# Patient Record
Sex: FEMALE | URBAN - METROPOLITAN AREA
[De-identification: names, ages, dates, MRNs, and addresses within clinical notes are randomized per-mention and may not be internally consistent; named-entity substitution may affect disease eponyms.]

---

## 2024-04-19 ENCOUNTER — LAB (OUTPATIENT)
Dept: URBAN - METROPOLITAN AREA CLINIC 2 | Facility: CLINIC | Age: 39
End: 2024-04-19

## 2024-04-19 ENCOUNTER — OV NP (OUTPATIENT)
Dept: URBAN - METROPOLITAN AREA CLINIC 2 | Facility: CLINIC | Age: 39
End: 2024-04-19
Payer: COMMERCIAL

## 2024-04-19 VITALS
HEART RATE: 83 BPM | SYSTOLIC BLOOD PRESSURE: 128 MMHG | TEMPERATURE: 98.1 F | DIASTOLIC BLOOD PRESSURE: 91 MMHG | WEIGHT: 149.6 LBS | HEIGHT: 62 IN | BODY MASS INDEX: 27.53 KG/M2

## 2024-04-19 DIAGNOSIS — K62.5 RECTAL BLEEDING: ICD-10-CM

## 2024-04-19 DIAGNOSIS — R10.32 LLQ PAIN: ICD-10-CM

## 2024-04-19 DIAGNOSIS — K21.9 GASTROESOPHAGEAL REFLUX DISEASE, UNSPECIFIED WHETHER ESOPHAGITIS PRESENT: ICD-10-CM

## 2024-04-19 PROBLEM — 235595009: Status: ACTIVE | Noted: 2024-04-19

## 2024-04-19 PROCEDURE — 99244 OFF/OP CNSLTJ NEW/EST MOD 40: CPT | Performed by: NURSE PRACTITIONER

## 2024-04-19 PROCEDURE — 99204 OFFICE O/P NEW MOD 45 MIN: CPT | Performed by: NURSE PRACTITIONER

## 2024-04-19 RX ORDER — DICYCLOMINE HYDROCHLORIDE 20 MG/1
1 TABLET TABLET ORAL
Qty: 90 TABLET | Refills: 0 | OUTPATIENT
Start: 2024-04-19 | End: 2024-05-19

## 2024-04-19 RX ORDER — PANTOPRAZOLE SODIUM 40 MG/1
1 TABLET TABLET, DELAYED RELEASE ORAL ONCE A DAY
Qty: 90 TABLET | Refills: 1 | OUTPATIENT
Start: 2024-04-19

## 2024-04-19 NOTE — PHYSICAL EXAM GASTROINTESTINAL
Abdomen , soft, left sided tenderness to palpation, nondistended , no guarding or rigidity , no masses palpable , normal bowel sounds , Liver and Spleen , no hepatomegaly present , no hepatosplenomegaly , liver nontender , spleen not palpable

## 2024-04-19 NOTE — HPI-TODAY'S VISIT:
This patient was referred by Dr. Sanjuanita Wall for an evaluation of LLQ abdominal pain and rectal bleeding.  A copy of this will be sent to the referring provider. Seen by GYN-neg ema. sent to GI for further evaluation. Pain ongoing almost 1 yr. has constipation and diarrhea. pain is left side periumbilical and radiates up. cannot lie on left side. has some reflux at night. recetly diagnosed with type 1 diabetes but hypoglycemic.  had hematochezia x4 days last week. did not seem like her usual bleeding withhemorrhoids. labs in [facility] and reviewed/ no anemia, normal lft. strong fam hx of gi cancers grandmother pancreatic mother gallbladder

## 2024-05-10 ENCOUNTER — OFFICE VISIT (OUTPATIENT)
Dept: URBAN - METROPOLITAN AREA SURGERY CENTER 31 | Facility: SURGERY CENTER | Age: 39
End: 2024-05-10
Payer: COMMERCIAL

## 2024-05-10 DIAGNOSIS — R12 HEARTBURN: ICD-10-CM

## 2024-05-10 DIAGNOSIS — R10.13 EPIGASTRIC ABDOMINAL PAIN: ICD-10-CM

## 2024-05-10 DIAGNOSIS — R12 BURNING REFLUX: ICD-10-CM

## 2024-05-10 DIAGNOSIS — K29.60 ADENOPAPILLOMATOSIS GASTRICA: ICD-10-CM

## 2024-05-10 DIAGNOSIS — R10.84 ABDOMINAL CRAMPING, GENERALIZED: ICD-10-CM

## 2024-05-10 DIAGNOSIS — K44.9 HIATAL HERNIA: ICD-10-CM

## 2024-05-10 DIAGNOSIS — K92.1 RECTAL BLEEDING: ICD-10-CM

## 2024-05-10 PROCEDURE — 45380 COLONOSCOPY AND BIOPSY: CPT | Performed by: INTERNAL MEDICINE

## 2024-05-10 PROCEDURE — 43239 EGD BIOPSY SINGLE/MULTIPLE: CPT | Performed by: INTERNAL MEDICINE

## 2024-05-10 PROCEDURE — G8907 PT DOC NO EVENTS ON DISCHARG: HCPCS | Performed by: INTERNAL MEDICINE

## 2024-05-10 PROCEDURE — 00813 ANES UPR LWR GI NDSC PX: CPT | Performed by: NURSE ANESTHETIST, CERTIFIED REGISTERED

## 2024-05-31 ENCOUNTER — OFFICE VISIT (OUTPATIENT)
Dept: URBAN - METROPOLITAN AREA CLINIC 2 | Facility: CLINIC | Age: 39
End: 2024-05-31
Payer: COMMERCIAL

## 2024-05-31 ENCOUNTER — DASHBOARD ENCOUNTERS (OUTPATIENT)
Age: 39
End: 2024-05-31

## 2024-05-31 VITALS
HEIGHT: 62 IN | WEIGHT: 144 LBS | HEART RATE: 82 BPM | DIASTOLIC BLOOD PRESSURE: 73 MMHG | TEMPERATURE: 98.9 F | SYSTOLIC BLOOD PRESSURE: 108 MMHG | BODY MASS INDEX: 26.5 KG/M2

## 2024-05-31 DIAGNOSIS — K29.50 CHRONIC GASTRITIS WITHOUT BLEEDING, UNSPECIFIED GASTRITIS TYPE: ICD-10-CM

## 2024-05-31 DIAGNOSIS — K58.2 IRRITABLE BOWEL SYNDROME WITH BOTH CONSTIPATION AND DIARRHEA: ICD-10-CM

## 2024-05-31 DIAGNOSIS — K44.9 HIATAL HERNIA: ICD-10-CM

## 2024-05-31 PROBLEM — 10743008: Status: ACTIVE | Noted: 2024-05-31

## 2024-05-31 PROBLEM — 8493009: Status: ACTIVE | Noted: 2024-05-31

## 2024-05-31 PROCEDURE — 99214 OFFICE O/P EST MOD 30 MIN: CPT | Performed by: NURSE PRACTITIONER

## 2024-05-31 RX ORDER — BUSPIRONE HYDROCHLORIDE 5 MG/1
TAKE 1 TABLET BY MOUTH EVERY DAY AS NEEDED FOR ANXIETY TABLET ORAL
Qty: 30 EACH | Refills: 0 | Status: ACTIVE | COMMUNITY

## 2024-05-31 RX ORDER — PANTOPRAZOLE SODIUM 40 MG/1
1 TABLET TABLET, DELAYED RELEASE ORAL ONCE A DAY
Qty: 90 TABLET | Refills: 1 | Status: ACTIVE | COMMUNITY
Start: 2024-04-19

## 2024-05-31 RX ORDER — DULOXETINE 30 MG/1
TAKE 1 CAPSULE BY MOUTH EVERY DAY FOR 30 DAYS CAPSULE, DELAYED RELEASE ORAL
Qty: 30 EACH | Refills: 0 | Status: ACTIVE | COMMUNITY

## 2024-05-31 RX ORDER — HYDROXYZINE HYDROCHLORIDE 25 MG/1
TAKE 1 TABLET BY MOUTH AS NEEDED TWICE A DAY AS NEEDED FOR ANXIETY 30 DAYS TABLET, FILM COATED ORAL
Qty: 60 EACH | Refills: 0 | Status: ACTIVE | COMMUNITY

## 2024-06-12 ENCOUNTER — TELEPHONE ENCOUNTER (OUTPATIENT)
Dept: URBAN - METROPOLITAN AREA CLINIC 80 | Facility: CLINIC | Age: 39
End: 2024-06-12

## 2024-09-12 ENCOUNTER — TELEPHONE ENCOUNTER (OUTPATIENT)
Dept: URBAN - METROPOLITAN AREA CLINIC 79 | Facility: CLINIC | Age: 39
End: 2024-09-12

## 2024-09-12 RX ORDER — DICYCLOMINE HYDROCHLORIDE 20 MG/1
1 TABLET TABLET ORAL
Qty: 90 TABLET | Refills: 0
Start: 2024-04-19 | End: 2024-10-12

## 2024-11-08 ENCOUNTER — TELEPHONE ENCOUNTER (OUTPATIENT)
Dept: URBAN - METROPOLITAN AREA CLINIC 79 | Facility: CLINIC | Age: 39
End: 2024-11-08

## 2024-11-08 RX ORDER — DICYCLOMINE HYDROCHLORIDE 20 MG/1
1 TABLET TABLET ORAL
Qty: 90 TABLET | Refills: 0
Start: 2024-04-19 | End: 2024-12-11

## 2024-11-08 RX ORDER — PANTOPRAZOLE SODIUM 40 MG/1
1 TABLET TABLET, DELAYED RELEASE ORAL ONCE A DAY
Qty: 90 TABLET | Refills: 1
Start: 2024-04-19